# Patient Record
Sex: MALE | Race: BLACK OR AFRICAN AMERICAN | NOT HISPANIC OR LATINO | URBAN - METROPOLITAN AREA
[De-identification: names, ages, dates, MRNs, and addresses within clinical notes are randomized per-mention and may not be internally consistent; named-entity substitution may affect disease eponyms.]

---

## 2024-06-17 ENCOUNTER — EMERGENCY (EMERGENCY)
Facility: HOSPITAL | Age: 49
LOS: 1 days | Discharge: HOME | End: 2024-06-17
Admitting: EMERGENCY MEDICINE
Payer: MEDICAID

## 2024-06-17 VITALS
SYSTOLIC BLOOD PRESSURE: 144 MMHG | HEART RATE: 92 BPM | TEMPERATURE: 99 F | OXYGEN SATURATION: 100 % | RESPIRATION RATE: 16 BRPM | DIASTOLIC BLOOD PRESSURE: 99 MMHG | WEIGHT: 149.91 LBS

## 2024-06-17 PROCEDURE — 99284 EMERGENCY DEPT VISIT MOD MDM: CPT | Mod: 25

## 2024-06-17 PROCEDURE — 12041 INTMD RPR N-HF/GENIT 2.5CM/<: CPT

## 2024-06-17 NOTE — ED ADULT TRIAGE NOTE - CHIEF COMPLAINT QUOTE
pt c/o right foot laceration s/p plate falling on foot x1 hour ago. No past medical hx. laceration observed to top of foot. bleeding controlled in triage with gauze and pressure. pt denies blood thinner use.

## 2024-06-18 PROCEDURE — 73630 X-RAY EXAM OF FOOT: CPT | Mod: 26,RT

## 2024-06-18 RX ORDER — TETANUS TOXOID, REDUCED DIPHTHERIA TOXOID AND ACELLULAR PERTUSSIS VACCINE, ADSORBED 5; 2.5; 8; 8; 2.5 [IU]/.5ML; [IU]/.5ML; UG/.5ML; UG/.5ML; UG/.5ML
0.5 SUSPENSION INTRAMUSCULAR ONCE
Refills: 0 | Status: COMPLETED | OUTPATIENT
Start: 2024-06-18 | End: 2024-06-18

## 2024-06-18 RX ORDER — CEPHALEXIN 500 MG
1 CAPSULE ORAL
Qty: 21 | Refills: 0
Start: 2024-06-18 | End: 2024-06-24

## 2024-06-18 RX ORDER — IBUPROFEN 200 MG
600 TABLET ORAL ONCE
Refills: 0 | Status: COMPLETED | OUTPATIENT
Start: 2024-06-18 | End: 2024-06-18

## 2024-06-18 RX ORDER — LIDOCAINE HYDROCHLORIDE AND EPINEPHRINE 10; 10 MG/ML; UG/ML
5 INJECTION, SOLUTION INFILTRATION; PERINEURAL ONCE
Refills: 0 | Status: COMPLETED | OUTPATIENT
Start: 2024-06-18 | End: 2024-06-18

## 2024-06-18 RX ORDER — CEPHALEXIN 500 MG
500 CAPSULE ORAL ONCE
Refills: 0 | Status: COMPLETED | OUTPATIENT
Start: 2024-06-18 | End: 2024-06-18

## 2024-06-18 RX ADMIN — Medication 500 MILLIGRAM(S): at 04:18

## 2024-06-18 RX ADMIN — LIDOCAINE HYDROCHLORIDE AND EPINEPHRINE 5 MILLILITER(S): 10; 10 INJECTION, SOLUTION INFILTRATION; PERINEURAL at 01:32

## 2024-06-18 RX ADMIN — TETANUS TOXOID, REDUCED DIPHTHERIA TOXOID AND ACELLULAR PERTUSSIS VACCINE, ADSORBED 0.5 MILLILITER(S): 5; 2.5; 8; 8; 2.5 SUSPENSION INTRAMUSCULAR at 01:05

## 2024-06-18 RX ADMIN — Medication 600 MILLIGRAM(S): at 01:05

## 2024-06-18 NOTE — ED PROVIDER NOTE - PHYSICAL EXAMINATION
CONSTITUTIONAL: Well-appearing; well-nourished; in no apparent distress. Non-toxic appearing.   NEURO: Alert & oriented. Gait steady without assistance. Sensory and motor functions are grossly intact.  PSYCH: Mood appropriate. Thought processes intact.   MUSCULOSKELETAL/EXTREMITIES: FROM in all four extremities; no extremity edema.  To the dorsum of the right mid-foot there is a linear ~ 2.5 cm laceration exposing subcutaneous tissue. Strength of flexion/extension of toes is equal when compared to left foot against gravity and resistance.    SKIN:  As noted above otherwise skin is warm; dry; no apparent lesions or exudate

## 2024-06-18 NOTE — ED ADULT NURSE NOTE - OBJECTIVE STATEMENT
WELLNESS RN. Patient A&Ox4 primarily Creole speaking (sister at bedside for translation) ambulatory c/o right foot laceration s/p dropping plate on foot tonight. No past medical hx. On assessment patient well appearing, respirations even and unlabored. +sensation. +ROM. No active bleeding noted. Patient medicated as per orders. Awaiting XR and laceration repair.

## 2024-06-18 NOTE — ED PROVIDER NOTE - PATIENT PORTAL LINK FT
You can access the FollowMyHealth Patient Portal offered by [facility] by registering at the following website: [email or web address]. By joining [employer]’s FollowMyHealth portal, you will also be able to view your health information using other applications (apps) compatible with our system.

## 2024-06-18 NOTE — ED PROVIDER NOTE - CHIEF COMPLAINT
The patient is a 48y Male complaining of foot pain/injury.
Partially impaired: cannot see medication labels or newsprint, but can see obstacles in path, and the surrounding layout; can count fingers at arm's length

## 2024-06-18 NOTE — ED PROVIDER NOTE - NSDCPRINTRESULTS_ED_ALL_ED
back/lower/Right:
Patient requests all Lab, Cardiology, and Radiology Results on their Discharge Instructions

## 2024-06-18 NOTE — ED PROVIDER NOTE - NSFOLLOWUPINSTRUCTIONS_ED_ALL_ED_FT
Wound Care Instructions    Keep the wound clean, dry, and covered for the next 24-48 hours. Then clean wound 1-2 times a day, patting dry, then covering with wound with band-aid.    DO NOT APPLY OINTMENTS to the wound, keep the wound dry unless cleaning it.     If you develop fever or chills and the wound becomes red, swollen, or develops red streaks or foul-smelling drainage seek healthcare provider or return to the ER.    Sutures to be removed in 10 days.     Laceration    A laceration is a cut that goes through all of the layers of the skin and into the tissue that is right under the skin. Some lacerations heal on their own. Others need to be closed with skin adhesive strips, skin glue, stitches (sutures), or staples. Proper laceration care minimizes the risk of infection and helps the laceration to heal better.  If non-absorbable stitches or staples have been placed, they must be taken out within the time frame instructed by your healthcare provider.    SEEK IMMEDIATE MEDICAL CARE IF YOU HAVE ANY OF THE FOLLOWING SYMPTOMS: swelling around the wound, worsening pain, drainage from the wound, red streaking going away from your wound, inability to move finger or toe near the laceration, or discoloration of skin near the laceration.

## 2024-06-18 NOTE — ED PROVIDER NOTE - CLINICAL SUMMARY MEDICAL DECISION MAKING FREE TEXT BOX
49 y/o male with no stated PMHx presents with sister who translates for evaluation of right foot laceration. Pt accidentally dropped a glass plate on his foot that shattered causing the wound. pt unsure of last tetanus, No paresthesias, weakness, or other injuries.   VSS. Exam as noted above. Will obtain plain films to r/o FB given history then repair wound and start Abx even if no FB identified given the risk there may be one present. Pt and sister counseled regarding signs/symptoms warranting return. All questions were answered, they verbalized understanding.

## 2024-06-28 ENCOUNTER — EMERGENCY (EMERGENCY)
Facility: HOSPITAL | Age: 49
LOS: 1 days | Discharge: HOME | End: 2024-06-28
Admitting: STUDENT IN AN ORGANIZED HEALTH CARE EDUCATION/TRAINING PROGRAM
Payer: MEDICAID

## 2024-06-28 VITALS
DIASTOLIC BLOOD PRESSURE: 89 MMHG | TEMPERATURE: 99 F | SYSTOLIC BLOOD PRESSURE: 126 MMHG | OXYGEN SATURATION: 99 % | RESPIRATION RATE: 18 BRPM | HEART RATE: 84 BPM

## 2024-06-28 PROCEDURE — L9995: CPT
